# Patient Record
Sex: FEMALE | NOT HISPANIC OR LATINO | Employment: FULL TIME | URBAN - METROPOLITAN AREA
[De-identification: names, ages, dates, MRNs, and addresses within clinical notes are randomized per-mention and may not be internally consistent; named-entity substitution may affect disease eponyms.]

---

## 2023-11-15 ENCOUNTER — OFFICE VISIT (OUTPATIENT)
Dept: PRIMARY CARE | Facility: CLINIC | Age: 23
End: 2023-11-15
Payer: COMMERCIAL

## 2023-11-15 VITALS
SYSTOLIC BLOOD PRESSURE: 123 MMHG | BODY MASS INDEX: 23.07 KG/M2 | WEIGHT: 138.5 LBS | DIASTOLIC BLOOD PRESSURE: 74 MMHG | OXYGEN SATURATION: 100 % | HEART RATE: 59 BPM | HEIGHT: 65 IN

## 2023-11-15 DIAGNOSIS — Z30.9 ENCOUNTER FOR CONTRACEPTIVE MANAGEMENT, UNSPECIFIED TYPE: Primary | ICD-10-CM

## 2023-11-15 PROCEDURE — 99385 PREV VISIT NEW AGE 18-39: CPT | Performed by: INTERNAL MEDICINE

## 2023-11-15 PROCEDURE — 1036F TOBACCO NON-USER: CPT | Performed by: INTERNAL MEDICINE

## 2023-11-15 RX ORDER — NORGESTIMATE AND ETHINYL ESTRADIOL 7DAYSX3 28
1 KIT ORAL
Qty: 28 TABLET | Refills: 3 | Status: SHIPPED | OUTPATIENT
Start: 2023-11-15 | End: 2023-12-29 | Stop reason: SDUPTHER

## 2023-11-15 RX ORDER — NORGESTIMATE AND ETHINYL ESTRADIOL 7DAYSX3 28
1 KIT ORAL
COMMUNITY
Start: 2023-10-24 | End: 2023-11-15 | Stop reason: SDUPTHER

## 2023-11-15 ASSESSMENT — PATIENT HEALTH QUESTIONNAIRE - PHQ9
SUM OF ALL RESPONSES TO PHQ9 QUESTIONS 1 AND 2: 0
1. LITTLE INTEREST OR PLEASURE IN DOING THINGS: NOT AT ALL
2. FEELING DOWN, DEPRESSED OR HOPELESS: NOT AT ALL

## 2023-11-15 ASSESSMENT — ENCOUNTER SYMPTOMS
LOSS OF SENSATION IN FEET: 0
DEPRESSION: 0
OCCASIONAL FEELINGS OF UNSTEADINESS: 0

## 2023-11-15 NOTE — PROGRESS NOTES
"Subjective   Patient ID: Cricket Luke is a 23 y.o. female who presents for Establish Care and Annual Exam.    HPI   Patient is a 23-year-old  female who comes to establish primary care.  She is due for an annual wellness exam and is agreeable to getting some baseline labs done as she has never had lab work done in the past.  Patient has been getting oral contraception from her gynecologist but is in the process of transitioning to a new GYN and needs refills in the interim.  She is not on any other prescription medications.  Vaccination records reviewed and I have advised patient to get a Tdap booster as well as the flu vaccine at her local pharmacy.  Patient is a runner and hurt her right ankle last year and though she denies any significant pain, she claims the medial aspect of her right ankle causes some discomfort after she overexerts herself but it does not limit any of her activities including running.  Menses are regular.  Pt denies, fever, chills, CP, SOB, abdominal pain, N/V/D/C or  symptoms. No HA, dizziness, numbness or weakness.  No loss of weight, loss of appetite, melena, rectal bleeding, mood or sleep issues.  Patient recently graduated from [employer] and currently works as a  for the [employer].  Review of Systems  As per hospitals  Pt has H/O precocious puberty.  Objective   /74 (BP Location: Right arm, Patient Position: Sitting, BP Cuff Size: Adult)   Pulse 59   Ht 1.638 m (5' 4.5\")   Wt 62.8 kg (138 lb 8 oz)   SpO2 100%   BMI 23.41 kg/m²     Physical Exam  General - well developed, well appearing, young  female in no acute respiratory distress  Eyes - normal conjunctiva with no pallor or icterus, normal extraocular movements  ENT - normal external auditory canals and tympanic membranes, throat clear with no exudates  Neck - No JVD, thyromegaly or lymphadenopathy  Lungs - no respiratory distress and lungs clear to auscultation bilaterally with no " rales or wheezes  Heart - normal S1, S2 with normal heart rate, rhythm and faint systolic murmur heard  Breasts, pelvic and pap - per gyn  Abdomen -  soft, nontender with no masses or organomegaly,  Extremities - no cyanosis or pedal edema  Neuro - grossly normal neuro exam with no focal neuro deficits  Psych - normal mental status, mood and affect   Skin - no rashes or ulcers  MSK - normal gait with grossly normal ROM of major joints  Assessment/Plan        1.  Annual wellness exam-baseline labs ordered, I have advised patient to get a Tdap booster and flu vaccine at her local pharmacy  2.  Contraceptive management-refills provided for oral contraceptive, patient will be seeing a new gynecologist in the near future  3.  Cardiac murmur-patient is not sure about the chronicity of this, 2D echo will be ordered for further evaluation  Follow-up in 1 year or sooner if needed.  40 minutes spent rooming the patient, reviewing records, eliciting history, examining patient, counseling, coordination of care and in documentation.  This note was partially generated using the Dragon voice recognition system. There may be some incorrect words, spelling and punctuation errors that were not corrected prior to committing the note to the patient's medical record.

## 2023-11-30 ENCOUNTER — HOSPITAL ENCOUNTER (OUTPATIENT)
Dept: CARDIOLOGY | Facility: CLINIC | Age: 23
Discharge: HOME | End: 2023-11-30
Payer: COMMERCIAL

## 2023-11-30 VITALS
SYSTOLIC BLOOD PRESSURE: 123 MMHG | BODY MASS INDEX: 22.99 KG/M2 | HEIGHT: 65 IN | WEIGHT: 138 LBS | DIASTOLIC BLOOD PRESSURE: 74 MMHG

## 2023-11-30 DIAGNOSIS — R01.1 CARDIAC MURMUR, UNSPECIFIED: ICD-10-CM

## 2023-11-30 DIAGNOSIS — Z30.9 ENCOUNTER FOR CONTRACEPTIVE MANAGEMENT, UNSPECIFIED TYPE: ICD-10-CM

## 2023-11-30 LAB
AORTIC VALVE MEAN GRADIENT: 15.2
AORTIC VALVE PEAK VELOCITY: 2.64
AV PEAK GRADIENT: 27.9
AVA (PEAK VEL): 1.03
AVA (VTI): 1.04
EJECTION FRACTION APICAL 4 CHAMBER: 64.3
EJECTION FRACTION: 64
LEFT VENTRICLE INTERNAL DIMENSION DIASTOLE: 5 (ref 3.5–6)
LEFT VENTRICULAR OUTFLOW TRACT DIAMETER: 2.03
MITRAL VALVE E/A RATIO: 2.31
RIGHT VENTRICLE FREE WALL PEAK S': 15
TRICUSPID ANNULAR PLANE SYSTOLIC EXCURSION: 2.1

## 2023-11-30 PROCEDURE — 93306 TTE W/DOPPLER COMPLETE: CPT | Performed by: INTERNAL MEDICINE

## 2023-11-30 PROCEDURE — 93306 TTE W/DOPPLER COMPLETE: CPT

## 2023-12-29 DIAGNOSIS — Z30.9 ENCOUNTER FOR CONTRACEPTIVE MANAGEMENT, UNSPECIFIED TYPE: ICD-10-CM

## 2023-12-29 RX ORDER — NORGESTIMATE AND ETHINYL ESTRADIOL 7DAYSX3 28
1 KIT ORAL
Qty: 28 TABLET | Refills: 0 | Status: SHIPPED | OUTPATIENT
Start: 2023-12-29 | End: 2024-02-26 | Stop reason: SDUPTHER

## 2024-01-22 ENCOUNTER — APPOINTMENT (OUTPATIENT)
Dept: CARDIOLOGY | Facility: CLINIC | Age: 24
End: 2024-01-22
Payer: COMMERCIAL

## 2024-01-26 PROBLEM — R01.1 HEART MURMUR: Status: ACTIVE | Noted: 2024-01-26

## 2024-01-29 ENCOUNTER — OFFICE VISIT (OUTPATIENT)
Dept: CARDIOLOGY | Facility: CLINIC | Age: 24
End: 2024-01-29
Payer: COMMERCIAL

## 2024-01-29 VITALS
DIASTOLIC BLOOD PRESSURE: 67 MMHG | OXYGEN SATURATION: 100 % | BODY MASS INDEX: 23.9 KG/M2 | HEIGHT: 64 IN | SYSTOLIC BLOOD PRESSURE: 106 MMHG | WEIGHT: 140 LBS | HEART RATE: 56 BPM

## 2024-01-29 DIAGNOSIS — R01.1 HEART MURMUR: ICD-10-CM

## 2024-01-29 DIAGNOSIS — I35.0 NONRHEUMATIC AORTIC VALVE STENOSIS: Primary | ICD-10-CM

## 2024-01-29 LAB
ATRIAL RATE: 55 BPM
P AXIS: 20 DEGREES
P OFFSET: 195 MS
P ONSET: 142 MS
PR INTERVAL: 162 MS
Q ONSET: 223 MS
QRS COUNT: 9 BEATS
QRS DURATION: 76 MS
QT INTERVAL: 422 MS
QTC CALCULATION(BAZETT): 403 MS
QTC FREDERICIA: 410 MS
R AXIS: 77 DEGREES
T AXIS: 53 DEGREES
T OFFSET: 434 MS
VENTRICULAR RATE: 55 BPM

## 2024-01-29 PROCEDURE — 99214 OFFICE O/P EST MOD 30 MIN: CPT | Performed by: INTERNAL MEDICINE

## 2024-01-29 PROCEDURE — 93005 ELECTROCARDIOGRAM TRACING: CPT | Performed by: INTERNAL MEDICINE

## 2024-01-29 PROCEDURE — 93010 ELECTROCARDIOGRAM REPORT: CPT | Performed by: INTERNAL MEDICINE

## 2024-01-29 PROCEDURE — 1036F TOBACCO NON-USER: CPT | Performed by: INTERNAL MEDICINE

## 2024-01-29 PROCEDURE — 99204 OFFICE O/P NEW MOD 45 MIN: CPT | Performed by: INTERNAL MEDICINE

## 2024-01-29 ASSESSMENT — PAIN SCALES - GENERAL: PAINLEVEL: 0-NO PAIN

## 2024-01-29 NOTE — PROGRESS NOTES
Subjective   Patient ID: Cricket Luke is a 23 y.o. female who presents for Bicuspid valve and AI    HPI: 23 year old female with no significant past medical history presenting for evaluation of Bicuspid valve c/b moderate AI. Murmur heard at outpatient office prompted echocardiogram which demonstrated bicuspid valve and moderate AI. Patient reports no symptoms at all. She exercises regularly without issue. Denies any chest pain, light headedness, dizziness, shortness of breath, orthopnea, pnd, leg swelling. Pt reports otherwise feeling well other than uncertainty and questions regarding these new findings. All other ROS negative.    Shx: Social alcohol use, no other drug use, no tobacco use.    Cardiac hx:  Echo 11/2023  CONCLUSIONS:   1. Left ventricular systolic function is normal with a 60-65% estimated ejection fraction.   2. Aortic valve appears abnormal.   3. The aortic valve appears bicuspid.   4. Moderate aortic valve regurgitation.    Review of Systems   All other systems reviewed and are negative.      Objective   Physical Exam  HENT:      Head: Normocephalic.   Eyes:      Pupils: Pupils are equal, round, and reactive to light.   Cardiovascular:      Rate and Rhythm: Normal rate.      Heart sounds: Murmur heard.   Pulmonary:      Effort: Pulmonary effort is normal.      Breath sounds: Normal breath sounds.   Abdominal:      General: Abdomen is flat. There is no distension.      Palpations: Abdomen is soft.   Musculoskeletal:         General: Normal range of motion.      Cervical back: Normal range of motion.   Skin:     General: Skin is warm.      Capillary Refill: Capillary refill takes less than 2 seconds.   Neurological:      General: No focal deficit present.      Mental Status: She is alert.   Psychiatric:         Mood and Affect: Mood normal.         Assessment/Plan : Patient is a 23 year old female with no significant past medical history who presents with newly found bicuspid aortic valve with  moderate aortic regurgitation and mild AS. Aortic root normal appearing on echocardiogram. Patient is asymptomatic at this time.    Plan:  -CT angio chest to assess for aortopathy   -Echocardiogram in 11/2024 (yearly echo)  -Renal function panel prior to CT (contrast)  -Can consider MRI/MRA in future for better valve assessment  -Monitor for worsening signs and symptoms    RTC in 1 year    PATIENT SEEN AND EXAMINED WITH RESIDENT. ASYMPTOMATIC BICUSPID AI WITH LIKELY MILD AORTIC STENOSIS. GET CTA CHEST TO ENSURE NO ASSOCIATED AORTOPATHY. REPEAT SURVEILLANCE TTE IN 11/2024. RTC 1 YEAR.

## 2024-02-05 ENCOUNTER — APPOINTMENT (OUTPATIENT)
Dept: CARDIOLOGY | Facility: CLINIC | Age: 24
End: 2024-02-05
Payer: COMMERCIAL

## 2024-02-19 ENCOUNTER — LAB (OUTPATIENT)
Dept: LAB | Facility: LAB | Age: 24
End: 2024-02-19
Payer: COMMERCIAL

## 2024-02-19 DIAGNOSIS — Z00.00 ANNUAL PHYSICAL EXAM: Primary | ICD-10-CM

## 2024-02-19 DIAGNOSIS — Z00.00 ANNUAL PHYSICAL EXAM: ICD-10-CM

## 2024-02-19 DIAGNOSIS — I35.0 NONRHEUMATIC AORTIC VALVE STENOSIS: ICD-10-CM

## 2024-02-19 DIAGNOSIS — Z30.9 ENCOUNTER FOR CONTRACEPTIVE MANAGEMENT, UNSPECIFIED TYPE: ICD-10-CM

## 2024-02-19 LAB
ALBUMIN SERPL BCP-MCNC: 4.3 G/DL (ref 3.4–5)
ALP SERPL-CCNC: 42 U/L (ref 33–110)
ALT SERPL W P-5'-P-CCNC: 10 U/L (ref 7–45)
ANION GAP SERPL CALC-SCNC: 15 MMOL/L (ref 10–20)
APPEARANCE UR: ABNORMAL
AST SERPL W P-5'-P-CCNC: 21 U/L (ref 9–39)
BILIRUB SERPL-MCNC: 0.5 MG/DL (ref 0–1.2)
BILIRUB UR STRIP.AUTO-MCNC: NEGATIVE MG/DL
BUN SERPL-MCNC: 19 MG/DL (ref 6–23)
CALCIUM SERPL-MCNC: 9.4 MG/DL (ref 8.6–10.3)
CHLORIDE SERPL-SCNC: 104 MMOL/L (ref 98–107)
CHOLEST SERPL-MCNC: 217 MG/DL (ref 0–199)
CHOLESTEROL/HDL RATIO: 3.2
CO2 SERPL-SCNC: 26 MMOL/L (ref 21–32)
COLOR UR: ABNORMAL
CREAT SERPL-MCNC: 1.1 MG/DL (ref 0.5–1.05)
EGFRCR SERPLBLD CKD-EPI 2021: 73 ML/MIN/1.73M*2
ERYTHROCYTE [DISTWIDTH] IN BLOOD BY AUTOMATED COUNT: 13.5 % (ref 11.5–14.5)
GLUCOSE SERPL-MCNC: 77 MG/DL (ref 74–99)
GLUCOSE UR STRIP.AUTO-MCNC: NEGATIVE MG/DL
HCT VFR BLD AUTO: 39.2 % (ref 36–46)
HCV AB SER QL: NONREACTIVE
HDLC SERPL-MCNC: 67.9 MG/DL
HGB BLD-MCNC: 12.5 G/DL (ref 12–16)
HIV 1+2 AB+HIV1 P24 AG SERPL QL IA: NONREACTIVE
HYALINE CASTS #/AREA URNS AUTO: ABNORMAL /LPF
KETONES UR STRIP.AUTO-MCNC: ABNORMAL MG/DL
LDLC SERPL CALC-MCNC: 121 MG/DL
LEUKOCYTE ESTERASE UR QL STRIP.AUTO: ABNORMAL
MCH RBC QN AUTO: 30.8 PG (ref 26–34)
MCHC RBC AUTO-ENTMCNC: 31.9 G/DL (ref 32–36)
MCV RBC AUTO: 97 FL (ref 80–100)
MUCOUS THREADS #/AREA URNS AUTO: ABNORMAL /LPF
NITRITE UR QL STRIP.AUTO: NEGATIVE
NON HDL CHOLESTEROL: 149 MG/DL (ref 0–149)
NRBC BLD-RTO: 0 /100 WBCS (ref 0–0)
PH UR STRIP.AUTO: 5 [PH]
PHOSPHATE SERPL-MCNC: 3.6 MG/DL (ref 2.5–4.9)
PLATELET # BLD AUTO: 257 X10*3/UL (ref 150–450)
POTASSIUM SERPL-SCNC: 4.6 MMOL/L (ref 3.5–5.3)
PROT SERPL-MCNC: 6.7 G/DL (ref 6.4–8.2)
PROT UR STRIP.AUTO-MCNC: ABNORMAL MG/DL
RBC # BLD AUTO: 4.06 X10*6/UL (ref 4–5.2)
RBC # UR STRIP.AUTO: NEGATIVE /UL
RBC #/AREA URNS AUTO: ABNORMAL /HPF
SODIUM SERPL-SCNC: 140 MMOL/L (ref 136–145)
SP GR UR STRIP.AUTO: 1.03
SQUAMOUS #/AREA URNS AUTO: ABNORMAL /HPF
TRIGL SERPL-MCNC: 142 MG/DL (ref 0–149)
TSH SERPL-ACNC: 1.48 MIU/L (ref 0.44–3.98)
UROBILINOGEN UR STRIP.AUTO-MCNC: 2 MG/DL
VLDL: 28 MG/DL (ref 0–40)
WBC # BLD AUTO: 6.4 X10*3/UL (ref 4.4–11.3)
WBC #/AREA URNS AUTO: ABNORMAL /HPF

## 2024-02-19 PROCEDURE — 86803 HEPATITIS C AB TEST: CPT

## 2024-02-19 PROCEDURE — 80053 COMPREHEN METABOLIC PANEL: CPT

## 2024-02-19 PROCEDURE — 36415 COLL VENOUS BLD VENIPUNCTURE: CPT

## 2024-02-19 PROCEDURE — 80061 LIPID PANEL: CPT

## 2024-02-19 PROCEDURE — 87389 HIV-1 AG W/HIV-1&-2 AB AG IA: CPT

## 2024-02-19 PROCEDURE — 84100 ASSAY OF PHOSPHORUS: CPT

## 2024-02-19 PROCEDURE — 85027 COMPLETE CBC AUTOMATED: CPT

## 2024-02-19 PROCEDURE — 84443 ASSAY THYROID STIM HORMONE: CPT

## 2024-02-19 PROCEDURE — 81001 URINALYSIS AUTO W/SCOPE: CPT

## 2024-02-26 ENCOUNTER — OFFICE VISIT (OUTPATIENT)
Dept: OBSTETRICS AND GYNECOLOGY | Facility: CLINIC | Age: 24
End: 2024-02-26
Payer: COMMERCIAL

## 2024-02-26 VITALS
DIASTOLIC BLOOD PRESSURE: 70 MMHG | BODY MASS INDEX: 23.56 KG/M2 | SYSTOLIC BLOOD PRESSURE: 120 MMHG | WEIGHT: 138 LBS | HEIGHT: 64 IN

## 2024-02-26 DIAGNOSIS — Z11.3 SCREENING EXAMINATION FOR STD (SEXUALLY TRANSMITTED DISEASE): ICD-10-CM

## 2024-02-26 DIAGNOSIS — Z01.419 WELL WOMAN EXAM WITH ROUTINE GYNECOLOGICAL EXAM: Primary | ICD-10-CM

## 2024-02-26 DIAGNOSIS — Z30.9 ENCOUNTER FOR CONTRACEPTIVE MANAGEMENT, UNSPECIFIED TYPE: ICD-10-CM

## 2024-02-26 DIAGNOSIS — Z12.4 CERVICAL CANCER SCREENING: ICD-10-CM

## 2024-02-26 DIAGNOSIS — Z30.41 ENCOUNTER FOR SURVEILLANCE OF CONTRACEPTIVE PILLS: ICD-10-CM

## 2024-02-26 PROBLEM — R63.4 ABNORMAL WEIGHT LOSS: Status: RESOLVED | Noted: 2019-06-25 | Resolved: 2024-02-26

## 2024-02-26 PROBLEM — F41.9 ANXIETY DISORDER: Status: ACTIVE | Noted: 2019-06-25

## 2024-02-26 PROBLEM — I35.0 NONRHEUMATIC AORTIC VALVE STENOSIS: Status: ACTIVE | Noted: 2024-01-29

## 2024-02-26 PROCEDURE — 99385 PREV VISIT NEW AGE 18-39: CPT | Performed by: OBSTETRICS & GYNECOLOGY

## 2024-02-26 PROCEDURE — 1036F TOBACCO NON-USER: CPT | Performed by: OBSTETRICS & GYNECOLOGY

## 2024-02-26 PROCEDURE — 88175 CYTOPATH C/V AUTO FLUID REDO: CPT

## 2024-02-26 PROCEDURE — 87800 DETECT AGNT MULT DNA DIREC: CPT

## 2024-02-26 RX ORDER — NORGESTIMATE AND ETHINYL ESTRADIOL 7DAYSX3 28
1 KIT ORAL
Qty: 84 TABLET | Refills: 3 | Status: SHIPPED | OUTPATIENT
Start: 2024-02-26

## 2024-02-26 ASSESSMENT — ENCOUNTER SYMPTOMS
CONSTITUTIONAL NEGATIVE: 1
NEUROLOGICAL NEGATIVE: 1
CARDIOVASCULAR NEGATIVE: 1
MUSCULOSKELETAL NEGATIVE: 1
PSYCHIATRIC NEGATIVE: 1
RESPIRATORY NEGATIVE: 1
GASTROINTESTINAL NEGATIVE: 1

## 2024-02-26 NOTE — PATIENT INSTRUCTIONS
Routine Gynecologic Visit:  You were seen today for a routine gyn visit with normal findings on exam  You were due for a pap smear today. You should still continue to get annual breast and pelvic exams in the office.  Continue self breast exams/monitoring at home and call for appointment in the office if there are ever masses, skin discoloration, dimpling/puckering of the skin, or nipple drainage when you are not pregnant  We discussed contraception today and a prescription for your birth control pill was sent to your pharmacy. Continue to use condoms with any new partners to protect against STD's and have routine STD screening done at your gynecologic visits if you have had a new partner in the last year or have new symptoms of pelvic pain, discharge, vulvar rashes. STD screening was done today, you will receive results by phone/MyChart  If you are having any gynecologic issues in the next year you should call the office. [phone] ([place]) or [phone] ([place])

## 2024-02-26 NOTE — PROGRESS NOTES
"Subjective   Cricket Luke is a 23 y.o. female who is here for a routine exam. Periods are regular every 28-30 days, lasting 6 days. Dysmenorrhea:none. Cyclic symptoms include pelvic pain. No intermenstrual bleeding, spotting, or discharge. Patient identifies as heterosexual and does not currently have a partner, denies dyspareunia.    Current contraception: OCP (estrogen/progesterone)  History of abnormal Pap smear: no  Family history of uterine or ovarian cancer: no  Regular self breast exam: yes  History of abnormal mammogram: no  Family history of breast cancer: yes - great aunt  History of abnormal lipids: mildly elevated  Menstrual History:  OB History          0    Para   0    Term   0       0    AB   0    Living   0         SAB   0    IAB   0    Ectopic   0    Multiple   0    Live Births   0                Menarche age: 14  Patient's last menstrual period was 2024 (exact date).         Review of Systems   Constitutional: Negative.    Respiratory: Negative.     Cardiovascular: Negative.    Gastrointestinal: Negative.    Genitourinary: Negative.    Musculoskeletal: Negative.    Neurological: Negative.    Psychiatric/Behavioral: Negative.         Objective   /70   Ht 1.626 m (5' 4\")   Wt 62.6 kg (138 lb)   LMP 2024 (Exact Date)   BMI 23.69 kg/m²     General:   alert, appears stated age, and cooperative   Heart: regular rate and rhythm, S1, S2 normal, no murmur, click, rub or gallop   Lungs: clear to auscultation bilaterally   Abdomen: soft, non-tender, without masses or organomegaly   Pelvic: Vulva normal in appearance without discoloration, rashes, lesions. Vagina is negative for blood, discharge, lesions. Uterus is small, mobile, non tender. There is no cervical motion tenderness, adnexal masses/tenderness.   Breast: No masses, skin changes, discoloration, tenderness, or nipple drainage bilaterally     Neck: Normal ROM. Thyroid normal size. No masses/tenderness     Lymph: " No LAD   Psych: Normal mood/affect     Assessment/Plan   Problem List Items Addressed This Visit    None  Visit Diagnoses       Well woman exam with routine gynecological exam    -  Primary    Breast and pelvic exam normal  Precautions given  RTO 1 year RA    Cervical cancer screening        Pap pending 2/26/24    Relevant Orders    THINPREP PAP    Screening examination for STD (sexually transmitted disease)        GC pending 2/26/24    Relevant Orders    THINPREP PAP    Encounter for surveillance of contraceptive pills        Rx refill sent to pharmacy    Encounter for contraceptive management, unspecified type        Relevant Medications    norgestimate-ethinyl estradioL (Ortho Tri-Cyclen,Trinessa) 0.18/0.215/0.25 mg-35 mcg (28) tablet          Saundra Ayala DO

## 2024-02-28 ENCOUNTER — HOSPITAL ENCOUNTER (OUTPATIENT)
Dept: RADIOLOGY | Facility: CLINIC | Age: 24
Discharge: HOME | End: 2024-02-28
Payer: COMMERCIAL

## 2024-02-28 DIAGNOSIS — I35.0 NONRHEUMATIC AORTIC VALVE STENOSIS: ICD-10-CM

## 2024-02-28 LAB
C TRACH RRNA SPEC QL NAA+PROBE: NEGATIVE
N GONORRHOEA DNA SPEC QL PROBE+SIG AMP: NEGATIVE

## 2024-02-28 PROCEDURE — 71275 CT ANGIOGRAPHY CHEST: CPT

## 2024-02-28 PROCEDURE — 2550000001 HC RX 255 CONTRASTS: Performed by: INTERNAL MEDICINE

## 2024-02-28 PROCEDURE — 71275 CT ANGIOGRAPHY CHEST: CPT | Performed by: RADIOLOGY

## 2024-02-28 RX ADMIN — IOHEXOL 75 ML: 350 INJECTION, SOLUTION INTRAVENOUS at 16:11

## 2024-03-04 ENCOUNTER — TELEPHONE (OUTPATIENT)
Dept: CARDIOLOGY | Facility: CLINIC | Age: 24
End: 2024-03-04
Payer: COMMERCIAL

## 2024-03-09 LAB
CYTOLOGY CMNT CVX/VAG CYTO-IMP: NORMAL
LAB AP CONTRACEPTIVE HISTORY: NORMAL
LAB AP HPV GENOTYPE QUESTION: YES
LAB AP HPV HR: NORMAL
LAB AP PAP ADDITIONAL TESTS: NORMAL
LABORATORY COMMENT REPORT: NORMAL
LMP START DATE: NORMAL
PATH REPORT.TOTAL CANCER: NORMAL

## 2024-03-19 ENCOUNTER — APPOINTMENT (OUTPATIENT)
Dept: CARDIOLOGY | Facility: CLINIC | Age: 24
End: 2024-03-19
Payer: COMMERCIAL

## 2024-11-05 ENCOUNTER — HOSPITAL ENCOUNTER (OUTPATIENT)
Dept: CARDIOLOGY | Facility: CLINIC | Age: 24
Discharge: HOME | End: 2024-11-05
Payer: COMMERCIAL

## 2024-11-05 DIAGNOSIS — I35.0 NONRHEUMATIC AORTIC VALVE STENOSIS: ICD-10-CM

## 2024-11-05 LAB
AORTIC VALVE MEAN GRADIENT: 13 MMHG
AORTIC VALVE PEAK VELOCITY: 2.37 M/S
AV PEAK GRADIENT: 22 MMHG
AVA (PEAK VEL): 1.63 CM2
AVA (VTI): 1.68 CM2
EJECTION FRACTION APICAL 4 CHAMBER: 65
EJECTION FRACTION: 63 %
LEFT ATRIUM VOLUME AREA LENGTH INDEX BSA: 43.4 ML/M2
LEFT VENTRICLE INTERNAL DIMENSION DIASTOLE: 4.51 CM (ref 3.5–6)
LEFT VENTRICULAR OUTFLOW TRACT DIAMETER: 2.21 CM
MITRAL VALVE E/A RATIO: 2.33
RIGHT VENTRICLE FREE WALL PEAK S': 15 CM/S
TRICUSPID ANNULAR PLANE SYSTOLIC EXCURSION: 2.7 CM

## 2024-11-05 PROCEDURE — 93306 TTE W/DOPPLER COMPLETE: CPT | Performed by: INTERNAL MEDICINE

## 2024-11-05 PROCEDURE — 93306 TTE W/DOPPLER COMPLETE: CPT

## 2024-11-12 ENCOUNTER — APPOINTMENT (OUTPATIENT)
Dept: CARDIOLOGY | Facility: CLINIC | Age: 24
End: 2024-11-12
Payer: COMMERCIAL

## 2024-11-18 ENCOUNTER — OFFICE VISIT (OUTPATIENT)
Dept: CARDIOLOGY | Facility: CLINIC | Age: 24
End: 2024-11-18
Payer: COMMERCIAL

## 2024-11-18 VITALS
HEIGHT: 64 IN | BODY MASS INDEX: 24.59 KG/M2 | SYSTOLIC BLOOD PRESSURE: 115 MMHG | OXYGEN SATURATION: 98 % | WEIGHT: 144 LBS | HEART RATE: 50 BPM | DIASTOLIC BLOOD PRESSURE: 73 MMHG

## 2024-11-18 DIAGNOSIS — I35.0 NONRHEUMATIC AORTIC VALVE STENOSIS: Primary | ICD-10-CM

## 2024-11-18 PROCEDURE — 3008F BODY MASS INDEX DOCD: CPT | Performed by: INTERNAL MEDICINE

## 2024-11-18 PROCEDURE — 99214 OFFICE O/P EST MOD 30 MIN: CPT | Performed by: INTERNAL MEDICINE

## 2024-11-18 ASSESSMENT — COLUMBIA-SUICIDE SEVERITY RATING SCALE - C-SSRS
1. IN THE PAST MONTH, HAVE YOU WISHED YOU WERE DEAD OR WISHED YOU COULD GO TO SLEEP AND NOT WAKE UP?: NO
2. HAVE YOU ACTUALLY HAD ANY THOUGHTS OF KILLING YOURSELF?: NO
6. HAVE YOU EVER DONE ANYTHING, STARTED TO DO ANYTHING, OR PREPARED TO DO ANYTHING TO END YOUR LIFE?: NO

## 2024-11-18 ASSESSMENT — PAIN SCALES - GENERAL: PAINLEVEL_OUTOF10: 0-NO PAIN

## 2024-11-18 ASSESSMENT — PATIENT HEALTH QUESTIONNAIRE - PHQ9
2. FEELING DOWN, DEPRESSED OR HOPELESS: NOT AT ALL
1. LITTLE INTEREST OR PLEASURE IN DOING THINGS: NOT AT ALL
SUM OF ALL RESPONSES TO PHQ9 QUESTIONS 1 AND 2: 0

## 2024-11-18 NOTE — PROGRESS NOTES
"CHIEF COMPLAINT: routine follow-up visit    HISTORY OF PRESENT ILLNESS:    PCP: Dr. Dyan Parker    Cricket is a 25 yo F with hx of bicuspid aortic valve with moderate AI who returns to Cardiology Clinic for follow-up visit; last seen by me in 1/2024. Accompanied by parents. Feels well and denies CP, SOB, dizziness, LH, LE edema, or palpitations. Exercises regularly without issue. 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    PRIOR CARDIAC TESTING:  -TTE (11/2024): EF 60-65%, LV mildly dilated, mod LAE, bicuspid aortic valve with fusion of left and right coronary cusps without evidence of aortic stenosis, increased aortic velocities, mild-mod AI eccentric and posteriorly directed (poor coaptation), mild PI, aortic root and ascending aorta normal  -TTE (2023): EF 60-65%, bicuspid aortic valve with moderate AI    ALLERGIES: NKDA     Current Outpatient Medications:     norgestimate-ethinyl estradioL (Ortho Tri-Cyclen,Trinessa) 0.18/0.215/0.25 mg-35 mcg (28) tablet, Take 1 tablet by mouth once daily., Disp: 84 tablet, Rfl: 3    /73 (BP Location: Right arm, Patient Position: Sitting)   Pulse 50   Ht 1.626 m (5' 4\")   Wt 65.3 kg (144 lb)   SpO2 98%   BMI 24.72 kg/m²     PHYSICAL EXAM:  GENERAL: NAD  HEENT: no JVD  CV: RRR with AI murmur  PULM: CTAB  EXT: non-edematous bilateral lower extremities     LABS  WBC (x10*3/uL)   Date Value   02/19/2024 6.4     Hemoglobin (g/dL)   Date Value   02/19/2024 12.5     Platelets (x10*3/uL)   Date Value   02/19/2024 257     Sodium (mmol/L)   Date Value   02/19/2024 140     Potassium (mmol/L)   Date Value   02/19/2024 4.6     Chloride (mmol/L)   Date Value   02/19/2024 104     Bicarbonate (mmol/L)   Date Value   02/19/2024 26     Urea Nitrogen (mg/dL)   Date Value   02/19/2024 19     Creatinine (mg/dL)   Date Value   02/19/2024 1.10 (H)     Calcium (mg/dL)   Date Value   02/19/2024 9.4     Total Protein (g/dL) "   Date Value   02/19/2024 6.7     Bilirubin, Total (mg/dL)   Date Value   02/19/2024 0.5     Alkaline Phosphatase (U/L)   Date Value   02/19/2024 42     ALT (U/L)   Date Value   02/19/2024 10     AST (U/L)   Date Value   02/19/2024 21     Glucose (mg/dL)   Date Value   02/19/2024 77     Cholesterol (mg/dL)   Date Value   02/19/2024 217 (H)     LDL Calculated (mg/dL)   Date Value   02/19/2024 121 (H)     HDL-Cholesterol (mg/dL)   Date Value   02/19/2024 67.9     Triglycerides (mg/dL)   Date Value   02/19/2024 142     ASSESSMENT/PLAN: 25 yo F with hx of bicuspid aortic valve with mild-moderate AI here for follow-up visit. TTE stable. Repeat echo in 2 years. RTC 1 year.

## 2025-02-28 DIAGNOSIS — Z30.9 ENCOUNTER FOR CONTRACEPTIVE MANAGEMENT, UNSPECIFIED TYPE: ICD-10-CM

## 2025-03-03 ENCOUNTER — TELEPHONE (OUTPATIENT)
Dept: OBSTETRICS AND GYNECOLOGY | Facility: CLINIC | Age: 25
End: 2025-03-03
Payer: COMMERCIAL

## 2025-03-03 RX ORDER — NORGESTIMATE AND ETHINYL ESTRADIOL 7DAYSX3 28
1 KIT ORAL DAILY
Qty: 84 TABLET | Refills: 3 | Status: SHIPPED | OUTPATIENT
Start: 2025-03-03

## 2025-03-04 ENCOUNTER — PATIENT OUTREACH (OUTPATIENT)
Dept: CARE COORDINATION | Facility: CLINIC | Age: 25
End: 2025-03-04
Payer: COMMERCIAL

## 2025-03-04 NOTE — PROGRESS NOTES
"INITIAL NUTRITION EDUCATION VISIT    Reason for Nutrition Visit:  Pt is a 24 y.o. female being seen in person referred for  general nutrition education     Past Medical Hx:  Patient Active Problem List   Diagnosis    Heart murmur    Anxiety disorder    Nonrheumatic aortic valve stenosis        Current Medications:   Current Outpatient Medications on File Prior to Visit   Medication Sig Dispense Refill    Tri-Sprintec, 28, 0.18/0.215/0.25 mg-35 mcg (28) tablet Take 1 tablet by mouth once daily. 84 tablet 3    [DISCONTINUED] norgestimate-ethinyl estradioL (Ortho Tri-Cyclen,Trinessa) 0.18/0.215/0.25 mg-35 mcg (28) tablet Take 1 tablet by mouth once daily. 84 tablet 3     No current facility-administered medications on file prior to visit.       Food & Nutrition Related History:  Dietary Considerations: see below   Food Allergies: pitted fruits- gets hives   Intolerance: None  Appetite: Normal  GI Symptoms : not reviewed   Swallowing Difficulty: No problems with swallowing  Chewing no problems chewing  Food Preparation: Patient  Cooking Skills/Barriers: None reported  Grocery Shopping: Patient  No difficulty affording food  Supplements: Denies   Lives alone     24 Diet Recall:  Not reviewed     Physical Activity:   Workout 7 days/week: strength 2-3 days/week, runs on weekends/Fri/Wed, takes fitness classes once a week.     Labs:  Lab Results   Component Value Date     02/19/2024    K 4.6 02/19/2024     02/19/2024    CO2 26 02/19/2024    BUN 19 02/19/2024    CREATININE 1.10 (H) 02/19/2024    CALCIUM 9.4 02/19/2024    ALBUMIN 4.3 02/19/2024    PROT 6.7 02/19/2024    BILITOT 0.5 02/19/2024    ALKPHOS 42 02/19/2024    ALT 10 02/19/2024    AST 21 02/19/2024    GLUCOSE 77 02/19/2024     Lab Results   Component Value Date    CHOL 217 (H) 02/19/2024    LDLCALC 121 (H) 02/19/2024    TRIG 142 02/19/2024    HDL 67.9 02/19/2024        Comments:     CMP:  WNL  Lipid panel:  noted, PCP \"said to keep an eye on " "this\"  Vitamin D:  none in chart   A1C:  none in chart     Anthropometrics:   Ht Readings from Last 1 Encounters:   11/18/24 1.626 m (5' 4\")      BMI Readings from Last 1 Encounters:   11/18/24 24.72 kg/m²      Wt Readings from Last 10 Encounters:   11/18/24 65.3 kg (144 lb)   02/26/24 62.6 kg (138 lb)   01/29/24 63.5 kg (140 lb)   11/30/23 62.6 kg (138 lb)   11/15/23 62.8 kg (138 lb 8 oz)      Weight change:  # doesn't have a scale at home but thinks this is where her wt is now.   Significant Weight Change: No    Visit Summary     Met with pt today. Wanted to meet with this RD to review general nutrition rec'd herself, has \"soft goal of getting leaner\". Pt has not worked with RD in the past, at one point was tracking meals on an rico for wt loss but discovered this was not good for her. Because more \"obsessive\" than she intended and prevented her from eating more freely. Is wondering how much protein she should be eating and how best to make sense of \"conflicting nutrition\" rec'd in media.     This RD edu pt on protein needs, shared RDA of protein (0.8g/kg) as a place to start, however this RD rec'd pt consume ~ 1.1g protein/kg/day which is ~70g/day. Pt is currently eating around 100g/day based on loosely tracking in her head. Pt can decrease since she has not been able to met her goal of getting leaning by eating this higher amount, and aim for closer to this RD's rec'd and pt voiced understanding. Discussed supplemental protein products and when it's appropriate to incorporate these, should aim for a food first approach.     Edu pt difference btwn focusing on on energy balanced for wt management and focusing on this as well as macros for wt management + body composition goals. Rec'd pt aim for 20-30g protein with meals. Lastly edu pt on where to find appropriate nutrition information in the media, urged pt to limit exposure to accounts that solely focus on fear mongering.     Nutrition Diagnosis:  Diagnosis " Statement 1:  Diagnosis Status: New  Diagnosis : Food and nutrition related knowledge deficit related to  pt receiving confusing and conflicting nutrition information in the media   as evidenced by  pt questions re: protein intake    Nutrition Interventions:  Provided nutrition education on the following topic(s):  adequate protein intake, energy balance  using ACONutritionCounseling: CBT and Goal Setting  Referred pt to Coordination of Care: None  Discussed with pt? Yes  Provided handouts on: n/a    Nutrition Goals:  Aim for ~70g protein/day with 20-30g with each meal   May include protein supplement if it offers added benefit of convenience     Readiness to Change : Good  Level of Understanding : Good  Anticipated Compliant : Good    Follow up Plan  Will follow-up x 1 mo

## 2025-04-01 ENCOUNTER — PATIENT OUTREACH (OUTPATIENT)
Dept: CARE COORDINATION | Facility: CLINIC | Age: 25
End: 2025-04-01
Payer: COMMERCIAL

## 2025-04-01 NOTE — PROGRESS NOTES
Pt emailed this RD this AM needing to cancel today's fuv. This RD emailed pt availability for when I am back in office. Will reach out to pt at later date to reschedule if needed.

## 2025-05-13 ENCOUNTER — PATIENT OUTREACH (OUTPATIENT)
Dept: CARE COORDINATION | Facility: CLINIC | Age: 25
End: 2025-05-13
Payer: COMMERCIAL

## 2025-05-13 NOTE — PROGRESS NOTES
"Nutrition Follow-up   Dietitian 2 wk follow-up. Pt is being seen in person for general nutrition education    Labs  Lab Results   Component Value Date     02/19/2024    K 4.6 02/19/2024     02/19/2024    CO2 26 02/19/2024    BUN 19 02/19/2024    CREATININE 1.10 (H) 02/19/2024    CALCIUM 9.4 02/19/2024    ALBUMIN 4.3 02/19/2024    PROT 6.7 02/19/2024    BILITOT 0.5 02/19/2024    ALKPHOS 42 02/19/2024    ALT 10 02/19/2024    AST 21 02/19/2024    GLUCOSE 77 02/19/2024       Lab Results   Component Value Date    CHOL 217 (H) 02/19/2024    LDLCALC 121 (H) 02/19/2024    TRIG 142 02/19/2024    HDL 67.9 02/19/2024          Comments: No new labs     Anthropometrics  Ht Readings from Last 1 Encounters:   11/18/24 1.626 m (5' 4\")       BMI Readings from Last 1 Encounters:   11/18/24 24.72 kg/m²       Wt Readings from Last 10 Encounters:   11/18/24 65.3 kg (144 lb)   02/26/24 62.6 kg (138 lb)   01/29/24 63.5 kg (140 lb)   11/30/23 62.6 kg (138 lb)   11/15/23 62.8 kg (138 lb 8 oz)       Weight change:  no new wts     Visit Summary    Followed up on goals from last fuv:   Aim for ~70g protein/day with 20-30g with each meal- has been doing well with this. Is more focused on meeting protein with meals vs overall daily amount. This has also helped to decrease evening snacking   May include protein supplement if it offers added benefit of convenience- has not needed to add this now that she is aiming for less overall protein     Is more relaxed with her diet now that she is not overly conserneed with meeting larger protein goal. Is less bloated too which she attributes to her eating less of the protein bars she was including in an effort to meet higher protein needs. Is now opting for Rx bars on occ ~2x/wk with better effect.     Went on vacation in April, felt she was a little leaner going into that trip- pt and RD attributed this her eating fewer kcals now that she is not eating so much protein. Traveled more in April " than she usually does which presented more barriers to following a balanced diet. Is ready to get back on track now.    To do this we discussed cooking more at home and strategies she can implement to do this. If she does eat out will choose leaner options or if this isn't available or she prefers a higher fat option she will choose smaller portions. Aim for half plate vegetables, 1/4 plate carb and 1/4 plate protein.      Nutrition Diagnosis:  Diagnosis Statement 1:  Diagnosis Status: Ongoing   Diagnosis : Food and nutrition related knowledge deficit related to cont need for RD assistance in meeting goals as evidenced by pt pt interview       Nutrition Goals    Follow my plate- half plate vegetables, 1/4 plate carb and 1/4 plate protein.  2. May cont with small piece of chocolate after dinner- reviewed label and determined this is a good choice given her goals   3. Eat out once per week, rest of meals can be made at home or from work     Follow up Plan  Will follow-up x 1 mo

## 2025-06-16 ENCOUNTER — PATIENT OUTREACH (OUTPATIENT)
Dept: CARE COORDINATION | Facility: CLINIC | Age: 25
End: 2025-06-16
Payer: COMMERCIAL

## 2025-06-16 NOTE — PROGRESS NOTES
Pt emailed this RD needing to reschedule July fuv. Pt rescheduled for when this RD is back in office on 8/5.

## 2025-07-10 ENCOUNTER — APPOINTMENT (OUTPATIENT)
Dept: PRIMARY CARE | Facility: CLINIC | Age: 25
End: 2025-07-10
Payer: COMMERCIAL

## 2025-07-10 VITALS
WEIGHT: 146 LBS | BODY MASS INDEX: 24.92 KG/M2 | HEIGHT: 64 IN | SYSTOLIC BLOOD PRESSURE: 116 MMHG | DIASTOLIC BLOOD PRESSURE: 76 MMHG

## 2025-07-10 DIAGNOSIS — E78.00 HYPERCHOLESTEREMIA: ICD-10-CM

## 2025-07-10 DIAGNOSIS — Z13.0 SCREENING FOR DEFICIENCY ANEMIA: ICD-10-CM

## 2025-07-10 DIAGNOSIS — I35.1 NONRHEUMATIC AORTIC VALVE INSUFFICIENCY: ICD-10-CM

## 2025-07-10 DIAGNOSIS — Q23.81 BICUSPID AORTIC VALVE: ICD-10-CM

## 2025-07-10 DIAGNOSIS — Z13.1 SCREENING FOR DIABETES MELLITUS: ICD-10-CM

## 2025-07-10 DIAGNOSIS — Z00.00 ROUTINE GENERAL MEDICAL EXAMINATION AT A HEALTH CARE FACILITY: Primary | ICD-10-CM

## 2025-07-10 DIAGNOSIS — Z13.6 ENCOUNTER FOR SCREENING FOR CARDIOVASCULAR DISORDERS: ICD-10-CM

## 2025-07-10 PROCEDURE — 99395 PREV VISIT EST AGE 18-39: CPT | Performed by: INTERNAL MEDICINE

## 2025-07-10 PROCEDURE — 1036F TOBACCO NON-USER: CPT | Performed by: INTERNAL MEDICINE

## 2025-07-10 PROCEDURE — 3008F BODY MASS INDEX DOCD: CPT | Performed by: INTERNAL MEDICINE

## 2025-07-10 ASSESSMENT — PATIENT HEALTH QUESTIONNAIRE - PHQ9
2. FEELING DOWN, DEPRESSED OR HOPELESS: NOT AT ALL
SUM OF ALL RESPONSES TO PHQ9 QUESTIONS 1 AND 2: 0
1. LITTLE INTEREST OR PLEASURE IN DOING THINGS: NOT AT ALL

## 2025-07-10 NOTE — PROGRESS NOTES
"Subjective   Patient ID: Cricket Luke is a 25 y.o. female who presents for Annual Exam.    [facility]   Cricket is a 25-year-old  female who comes today for an annual wellness exam and lab work.  Pap smear done in February 2024 was negative.  Patient is on oral contraception per OB/GYN.  She has been evaluated by cardiology for heart murmur-patient diagnosed with bicuspid aortic valve with aortic regurgitation and she has undergone an echocardiogram initially in November 2023 and subsequently in November 2024.  During her last appointment with the cardiologist in November 2024, patient was advised to get a repeat echo done in 2 years and to follow-up a year later.  Patient denies fever, chills, chest pain, shortness of breath, cough, dizziness, palpitations, syncope, abdominal pain, nausea, vomiting, diarrhea, melena, rectal bleeding, loss of weight, loss of appetite, dysuria, hematuria, headaches, weakness, numbness, mood or sleep issues.  Menses are regular and vision is stable with contact lenses.  Review of Systems  As per [facility].  Objective   /76 (BP Location: Right arm, Patient Position: Sitting, BP Cuff Size: Large adult)   Ht 1.626 m (5' 4\")   Wt 66.2 kg (146 lb)   BMI 25.06 kg/m²     Physical Exam  General - well developed, well appearing, young  female in no acute respiratory distress  Eyes - normal conjunctiva with no pallor or icterus, normal extraocular movements  ENT - normal external auditory canals and tympanic membranes, throat clear with no exudates  Neck - No JVD, thyromegaly or lymphadenopathy  Lungs - no respiratory distress and lungs clear to auscultation bilaterally with no rales or wheezes  Heart - normal S1, S2 with normal heart rate, rhythm and faint systolic murmur heard (unchanged)  Breasts, pelvic and pap - per gyn  Abdomen -  soft, nontender with no masses or organomegaly,  Extremities - no cyanosis or pedal edema  Neuro - grossly normal neuro exam with no focal neuro " deficits  Psych - normal mental status, mood and affect   Skin - no rashes or ulcers  MSK - normal gait with grossly normal ROM of major joints  Assessment/Plan       1.  Annual wellness exam-routine labs will be ordered, patient is up-to-date on Pap/pelvic to gynecology  2.  Hypercholesterolemia-fasting lipid panel will be ordered  3.  Screening for diabetes-hemoglobin A1c will be ordered  4.  Screening for anemia-CBC will be ordered  5.  Cardiac murmur, bicuspid aortic valve with aortic regurgitation-patient will follow-up with cardiology in November 2025 as recommended, she is asymptomatic  Follow-up in 1 year or sooner if needed.  This note was partially generated using the Dragon voice recognition system. There may be some incorrect words, spelling and punctuation errors that were not corrected prior to committing the note to the patient's medical record.

## 2025-07-30 ENCOUNTER — PATIENT OUTREACH (OUTPATIENT)
Dept: CARE COORDINATION | Facility: CLINIC | Age: 25
End: 2025-07-30
Payer: COMMERCIAL

## 2025-07-30 NOTE — PROGRESS NOTES
Pt emailed this RD needing to cancel this months fuv. This RD emailed pt September schedule, waiting for pt to return email at this time.

## 2025-09-02 ENCOUNTER — PATIENT OUTREACH (OUTPATIENT)
Dept: CARE COORDINATION | Facility: CLINIC | Age: 25
End: 2025-09-02
Payer: COMMERCIAL